# Patient Record
Sex: MALE | Race: WHITE | NOT HISPANIC OR LATINO | URBAN - METROPOLITAN AREA
[De-identification: names, ages, dates, MRNs, and addresses within clinical notes are randomized per-mention and may not be internally consistent; named-entity substitution may affect disease eponyms.]

---

## 2023-06-20 ENCOUNTER — EMERGENCY (EMERGENCY)
Facility: HOSPITAL | Age: 43
LOS: 0 days | Discharge: HOME | End: 2023-06-21
Attending: EMERGENCY MEDICINE
Payer: COMMERCIAL

## 2023-06-20 VITALS
HEART RATE: 90 BPM | TEMPERATURE: 99 F | DIASTOLIC BLOOD PRESSURE: 92 MMHG | SYSTOLIC BLOOD PRESSURE: 132 MMHG | RESPIRATION RATE: 18 BRPM | OXYGEN SATURATION: 100 %

## 2023-06-20 VITALS — HEIGHT: 72 IN | WEIGHT: 175.05 LBS

## 2023-06-20 DIAGNOSIS — H57.89 OTHER SPECIFIED DISORDERS OF EYE AND ADNEXA: ICD-10-CM
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
## 2023-06-20 DIAGNOSIS — S05.01XA INJURY OF CONJUNCTIVA AND CORNEAL ABRASION WITHOUT FOREIGN BODY, RIGHT EYE, INITIAL ENCOUNTER: ICD-10-CM

## 2023-06-20 DIAGNOSIS — Z77.098 CONTACT WITH AND (SUSPECTED) EXPOSURE TO OTHER HAZARDOUS, CHIEFLY NONMEDICINAL, CHEMICALS: ICD-10-CM
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
## 2023-06-20 PROCEDURE — 99284 EMERGENCY DEPT VISIT MOD MDM: CPT

## 2023-06-20 PROCEDURE — 99283 EMERGENCY DEPT VISIT LOW MDM: CPT

## 2023-06-20 RX ORDER — ALPRAZOLAM 0.25 MG
0.5 TABLET ORAL ONCE
Refills: 0 | Status: DISCONTINUED | OUTPATIENT
Start: 2023-06-20 | End: 2023-06-20

## 2023-06-20 RX ADMIN — Medication 0.5 MILLIGRAM(S): at 23:17

## 2023-06-20 RX ADMIN — Medication 1 DROP(S): at 22:50

## 2023-06-20 NOTE — ED STATDOCS - NSFOLLOWUPINSTRUCTIONS_ED_ALL_ED_FT
Chemical Conjunctivitis, Adult polytrim 2 drops to right eye 4 times a day for 1 week    Chemical conjunctivitis is irritation and swelling (inflammation) in your eye. It is also called chemical pink eye. This condition happens when a chemical gets in your eye. The condition makes your eye red, pink, and itchy.    You can get this condition in one eye or both eyes. You cannot spread this condition to another person.    What are the causes?  This condition is caused by a chemical or other irritant getting in your eye, such as:  Smoke.  Chlorine.  Soap.  Fumes.  Air pollution.  What increases the risk?  Living in a place with a lot of air pollution.  Working in a place with chemicals in the air.  Using swimming pools often.  Wearing contact lenses.  What are the signs or symptoms?  Eye redness.  Itchy eyes.  Burning feeling in the eyes.  Eye pain.  Clear drainage from the eyes.  Swollen eyelids.  Sensitivity to light.  How is this treated?  Treatment begins with rinsing (flushing) the chemical out of your eye. The sooner this is done, the better. Treatment may also include:  Artificial tears to help wash out any chemical that is still in the eye.  Steroid eye drops to ease swelling and irritation.  Antibiotic medicine to prevent infection.  Putting cool, wet cloths (cool compresses) on your eye.  Pain medicine.  Follow these instructions at home:  Medicines      Take or apply over-the-counter and prescription medicines only as told by your doctor.  If you were prescribed an antibiotic medicine, take or apply it as told by your doctor. Do not stop using it even if you start to feel better.  Use eye drops and ointments as told by your doctor.  General instructions    Until your eye irritation and swelling are gone:  Do not wear contacts. Wear glasses instead.  Do not wear eye makeup.  Do not touch or rub your eyes.  Put a clean cool, wet cloth on your eye for 10–20 minutes. Do this 3–4 times a day as needed.  Avoid being around the chemical or the place that caused the irritation. Wear eye protection if you need to.  Wash your hands with soap and water for at least 20 seconds before you use eye drops or you put a cool, wet cloth on your eyes. If you cannot use soap and water, use hand .  Keep all follow-up visits.  Contact a doctor if:  Your symptoms do not get better.  Your symptoms get worse.  You have new symptoms.  Your pain gets worse.  You have pus in your eye.  Get help right away if:  Your ability to see (vision) suddenly gets worse.  Summary  Chemical conjunctivitis is irritation and swelling (inflammation) in your eye. It is also called chemical pink eye.  This condition happens when a chemical gets in your eye.  Take or apply medicines only as told by your doctor.  Put a clean cool, wet cloth on your eye for 10–20 minutes. Do this 3–4 times a day as needed.  Until your eye irritation and swelling are gone, do not wear contacts or eye makeup and do not touch or rub your eyes.  This information is not intended to replace advice given to you by your health care provider. Make sure you discuss any questions you have with your health care provider.    Document Revised: 10/13/2021 Document Reviewed: 10/13/2021

## 2023-06-20 NOTE — ED STATDOCS - PATIENT PORTAL LINK FT
You can access the FollowMyHealth Patient Portal offered by [facility] by registering at the following website: [email or web address]. By joining [facility]’s FollowMyHealth portal, you will also be able to view your health information using other applications (apps) compatible with our system.

## 2023-06-20 NOTE — ED ADULT TRIAGE NOTE - CHIEF COMPLAINT QUOTE
Pt c/o getting  in his R eye. Endorses eye redness and burning. Washed eye out with water with no relief. Denies vision changes.

## 2023-06-20 NOTE — ED STATDOCS - CLINICAL SUMMARY MEDICAL DECISION MAKING FREE TEXT BOX
will proceed with irrigation with dontae lens to right eye. will proceed with irrigation with dontae lens to right eye. tet utd and polytrim for slight corneal abrasion

## 2023-06-20 NOTE — ED STATDOCS - OBJECTIVE STATEMENT
43 y/o male w/ no pertinent PMHx presents to ED c/o right eye redness, burning, and swelling s/p accidently getting 3D BDX  to R eye. pt irrigated eye with cold water which provided slight relief. denies rubbing eye. reports improvement at this time. pt went to  and was told to come to ED for further evaluation.

## 2023-06-21 RX ORDER — TETANUS TOXOID, REDUCED DIPHTHERIA TOXOID AND ACELLULAR PERTUSSIS VACCINE, ADSORBED 5; 2.5; 8; 8; 2.5 [IU]/.5ML; [IU]/.5ML; UG/.5ML; UG/.5ML; UG/.5ML
0.5 SUSPENSION INTRAMUSCULAR ONCE
Refills: 0 | Status: COMPLETED | OUTPATIENT
Start: 2023-06-21 | End: 2023-06-21

## 2023-06-21 RX ORDER — POLYMYXIN B SULF/TRIMETHOPRIM 10000-1/ML
2 DROPS OPHTHALMIC (EYE) ONCE
Refills: 0 | Status: COMPLETED | OUTPATIENT
Start: 2023-06-21 | End: 2023-06-21

## 2023-06-21 RX ADMIN — Medication 2 DROP(S): at 00:15

## 2024-06-19 ENCOUNTER — OFFICE (OUTPATIENT)
Dept: URBAN - METROPOLITAN AREA CLINIC 112 | Facility: CLINIC | Age: 44
Setting detail: OPHTHALMOLOGY
End: 2024-06-19
Payer: COMMERCIAL

## 2024-06-19 ENCOUNTER — RX ONLY (RX ONLY)
Age: 44
End: 2024-06-19

## 2024-06-19 DIAGNOSIS — H10.013: ICD-10-CM

## 2024-06-19 DIAGNOSIS — D31.62: ICD-10-CM

## 2024-06-19 DIAGNOSIS — H01.003: ICD-10-CM

## 2024-06-19 PROCEDURE — 99213 OFFICE O/P EST LOW 20 MIN: CPT | Performed by: OPHTHALMOLOGY

## 2024-06-19 ASSESSMENT — LID EXAM ASSESSMENTS
OS_BLEPHARITIS: 1+
OD_BLEPHARITIS: 1+

## 2024-06-19 ASSESSMENT — CONFRONTATIONAL VISUAL FIELD TEST (CVF)
OD_FINDINGS: FULL
OS_FINDINGS: FULL

## 2024-06-25 ENCOUNTER — OFFICE (OUTPATIENT)
Dept: URBAN - METROPOLITAN AREA CLINIC 112 | Facility: CLINIC | Age: 44
Setting detail: OPHTHALMOLOGY
End: 2024-06-25
Payer: COMMERCIAL

## 2024-06-25 DIAGNOSIS — H01.003: ICD-10-CM

## 2024-06-25 DIAGNOSIS — D17.79: ICD-10-CM

## 2024-06-25 DIAGNOSIS — H10.013: ICD-10-CM

## 2024-06-25 PROCEDURE — 92012 INTRM OPH EXAM EST PATIENT: CPT | Performed by: OPHTHALMOLOGY

## 2024-06-25 ASSESSMENT — LID EXAM ASSESSMENTS
OD_BLEPHARITIS: 1+
OS_BLEPHARITIS: 1+

## 2024-07-17 ENCOUNTER — OFFICE (OUTPATIENT)
Dept: URBAN - METROPOLITAN AREA CLINIC 112 | Facility: CLINIC | Age: 44
Setting detail: OPHTHALMOLOGY
End: 2024-07-17
Payer: COMMERCIAL

## 2024-07-17 DIAGNOSIS — D31.62: ICD-10-CM

## 2024-07-17 DIAGNOSIS — H01.003: ICD-10-CM

## 2024-07-17 DIAGNOSIS — H10.013: ICD-10-CM

## 2024-07-17 DIAGNOSIS — D17.79: ICD-10-CM

## 2024-07-17 PROCEDURE — 99213 OFFICE O/P EST LOW 20 MIN: CPT | Performed by: OPHTHALMOLOGY

## 2024-07-17 ASSESSMENT — CONFRONTATIONAL VISUAL FIELD TEST (CVF)
OD_FINDINGS: FULL
OS_FINDINGS: FULL

## 2024-07-17 ASSESSMENT — LID EXAM ASSESSMENTS
OD_BLEPHARITIS: 1+
OS_BLEPHARITIS: 1+

## 2025-06-14 NOTE — ED STATDOCS - CHIEF COMPLAINT
Thanks for participating in a office visit with the [facility] today.    Episode of possible symptomatic A-fib this morning, brief in nature.   Fatigue and lightheadedness resolved after stopping the chlorthalidone and switching to long acting Toprol.   Continue to monitor for any increased frequency of episodes or lasting in long duration   Reviewed echocardiogram showing normal heart function with mild leak in 1 valve ( unchanged).   Continue current medical therapy   Alcohol in strict moderation.     Follow up with [facility] in 6 months or sooner if needed     Please call my nurse at [phone] with any questions or concerns.    Scheduling phone number: [phone]  Reminder: Please bring in all current medications, over the counter supplements and vitamin bottles to your next appointment.         The patient is a 42y year old Male complaining of eye redness. Abdominal Pain, N/V/D